# Patient Record
Sex: FEMALE | Race: WHITE | NOT HISPANIC OR LATINO
[De-identification: names, ages, dates, MRNs, and addresses within clinical notes are randomized per-mention and may not be internally consistent; named-entity substitution may affect disease eponyms.]

---

## 2020-11-06 PROBLEM — Z00.00 ENCOUNTER FOR PREVENTIVE HEALTH EXAMINATION: Status: ACTIVE | Noted: 2020-11-06

## 2020-12-23 ENCOUNTER — APPOINTMENT (OUTPATIENT)
Dept: OBGYN | Facility: CLINIC | Age: 65
End: 2020-12-23
Payer: MEDICARE

## 2020-12-23 VITALS
BODY MASS INDEX: 25.52 KG/M2 | TEMPERATURE: 97.3 F | WEIGHT: 130 LBS | HEIGHT: 60 IN | SYSTOLIC BLOOD PRESSURE: 118 MMHG | DIASTOLIC BLOOD PRESSURE: 70 MMHG

## 2020-12-23 DIAGNOSIS — Z86.69 PERSONAL HISTORY OF OTHER DISEASES OF THE NERVOUS SYSTEM AND SENSE ORGANS: ICD-10-CM

## 2020-12-23 DIAGNOSIS — Z80.41 FAMILY HISTORY OF MALIGNANT NEOPLASM OF OVARY: ICD-10-CM

## 2020-12-23 DIAGNOSIS — Z80.3 FAMILY HISTORY OF MALIGNANT NEOPLASM OF BREAST: ICD-10-CM

## 2020-12-23 DIAGNOSIS — Z86.39 PERSONAL HISTORY OF OTHER ENDOCRINE, NUTRITIONAL AND METABOLIC DISEASE: ICD-10-CM

## 2020-12-23 DIAGNOSIS — Z63.5 DISRUPTION OF FAMILY BY SEPARATION AND DIVORCE: ICD-10-CM

## 2020-12-23 DIAGNOSIS — Z78.9 OTHER SPECIFIED HEALTH STATUS: ICD-10-CM

## 2020-12-23 DIAGNOSIS — Z84.1 FAMILY HISTORY OF DISORDERS OF KIDNEY AND URETER: ICD-10-CM

## 2020-12-23 PROCEDURE — G0101: CPT

## 2020-12-23 PROCEDURE — 99072 ADDL SUPL MATRL&STAF TM PHE: CPT

## 2020-12-23 RX ORDER — LIOTHYRONINE SODIUM 5 UG/1
5 TABLET ORAL
Refills: 0 | Status: ACTIVE | COMMUNITY

## 2020-12-23 RX ORDER — TOPIRAMATE 50 MG/1
50 TABLET, FILM COATED ORAL
Refills: 0 | Status: ACTIVE | COMMUNITY

## 2020-12-23 RX ORDER — BUSPIRONE HYDROCHLORIDE 15 MG/1
15 TABLET ORAL
Refills: 0 | Status: ACTIVE | COMMUNITY

## 2020-12-23 RX ORDER — TRIAMTERENE AND HYDROCHLOROTHIAZIDE 37.5; 25 MG/1; MG/1
37.5-25 CAPSULE ORAL
Refills: 0 | Status: ACTIVE | COMMUNITY

## 2020-12-23 RX ORDER — EPINEPHRINE 1 MG/ML
INJECTION INTRAMUSCULAR; INTRAVENOUS; SUBCUTANEOUS
Refills: 0 | Status: ACTIVE | COMMUNITY

## 2020-12-23 RX ORDER — ACETAMINOPHEN 325 MG
TABLET ORAL
Refills: 0 | Status: ACTIVE | COMMUNITY

## 2020-12-23 RX ORDER — VITAMIN E 268 MG
400 CAPSULE ORAL
Refills: 0 | Status: ACTIVE | COMMUNITY

## 2020-12-23 RX ORDER — POTASSIUM CHLORIDE 1.5 G/1.58G
20 POWDER, FOR SOLUTION ORAL
Refills: 0 | Status: ACTIVE | COMMUNITY

## 2020-12-23 RX ORDER — LEVOTHYROXINE SODIUM 112 MCG
112 TABLET ORAL
Refills: 0 | Status: ACTIVE | COMMUNITY

## 2020-12-23 RX ORDER — VALACYCLOVIR 500 MG/1
500 TABLET, FILM COATED ORAL
Refills: 0 | Status: ACTIVE | COMMUNITY

## 2020-12-23 SDOH — SOCIAL STABILITY - SOCIAL INSECURITY: DISRUPTION OF FAMILY BY SEPARATION AND DIVORCE: Z63.5

## 2020-12-23 NOTE — DISCUSSION/SUMMARY
[FreeTextEntry1] : 66 yo here for well woman exam. \par -Pap + HPV today given no prior records, if negative, can discontinue given age\par -Up to date on health maintenance screening\par -Return in 1 yr or prn

## 2020-12-23 NOTE — PHYSICAL EXAM
[Appropriately responsive] : appropriately responsive [Alert] : alert [No Acute Distress] : no acute distress [No Lymphadenopathy] : no lymphadenopathy [Soft] : soft [Non-tender] : non-tender [Non-distended] : non-distended [No HSM] : No HSM [No Lesions] : no lesions [No Mass] : no mass [Oriented x3] : oriented x3 [Examination Of The Breasts] : a normal appearance [No Discharge] : no discharge [No Masses] : no breast masses were palpable [Labia Majora] : normal [Labia Minora] : normal [Normal] : normal [Uterine Adnexae] : normal [Tenderness] : nontender [Enlarged ___ wks] : not enlarged

## 2020-12-23 NOTE — HISTORY OF PRESENT ILLNESS
[postmenopausal] : postmenopausal [N] : Patient is not sexually active [Y] : Positive pregnancy history [TextBox_4] : 64 yo here for well woman exam. Menopause age 35. Denies PMB, abdominal pain. No complaints today.  [PGxTotal] : 1 [[facility] [[facility] [FreeTextEntry1] : NSVDx1. Denies cysts, fibroids, abnormal pap, STI

## 2020-12-29 LAB — HPV HIGH+LOW RISK DNA PNL CVX: NOT DETECTED

## 2020-12-30 LAB — CYTOLOGY CVX/VAG DOC THIN PREP: ABNORMAL

## 2021-01-06 ENCOUNTER — APPOINTMENT (OUTPATIENT)
Dept: OBGYN | Facility: CLINIC | Age: 66
End: 2021-01-06

## 2022-01-03 ENCOUNTER — APPOINTMENT (OUTPATIENT)
Dept: OBGYN | Facility: CLINIC | Age: 67
End: 2022-01-03
Payer: MEDICARE

## 2022-01-03 VITALS
WEIGHT: 128 LBS | DIASTOLIC BLOOD PRESSURE: 64 MMHG | HEIGHT: 60 IN | TEMPERATURE: 97.3 F | SYSTOLIC BLOOD PRESSURE: 110 MMHG | RESPIRATION RATE: 16 BRPM | BODY MASS INDEX: 25.13 KG/M2

## 2022-01-03 PROCEDURE — G0101: CPT

## 2022-01-03 PROCEDURE — 99397 PER PM REEVAL EST PAT 65+ YR: CPT

## 2022-01-03 NOTE — HISTORY OF PRESENT ILLNESS
[Patient reported PAP Smear was normal] : Patient reported PAP Smear was normal [postmenopausal] : postmenopausal [N] : Patient is not sexually active [Y] : Positive pregnancy history [Patient reported mammogram was normal] : Patient reported mammogram was normal [Patient reported bone density results were normal] : Patient reported bone density results were normal [TextBox_4] : 65 yo here for well woman exam. Menopause age 35. Denies PMB, abdominal pain. No complaints today.  [Mammogramdate] : 2021 [PapSmeardate] : 12/30/2020 [TextBox_31] : HPV neg [BoneDensityDate] : 2021 [PGxTotal] : 1 [[facility]] : 1 [[facility]] : 1 [FreeTextEntry1] : NSVDx1. Denies cysts, fibroids, abnormal pap, STI

## 2022-01-03 NOTE — PHYSICAL EXAM
[Chaperone Present] : A chaperone was present in the examining room during all aspects of the physical examination [FreeTextEntry1] : MANPREET Valadez  [Appropriately responsive] : appropriately responsive [Alert] : alert [No Acute Distress] : no acute distress [No Lymphadenopathy] : no lymphadenopathy [Soft] : soft [Non-tender] : non-tender [Non-distended] : non-distended [No HSM] : No HSM [No Lesions] : no lesions [No Mass] : no mass [Oriented x3] : oriented x3 [Examination Of The Breasts] : a normal appearance [No Discharge] : no discharge [No Masses] : no breast masses were palpable [Labia Majora] : normal [Labia Minora] : normal [Normal] : normal [Tenderness] : nontender [Enlarged ___ wks] : not enlarged [Uterine Adnexae] : normal

## 2022-11-07 ENCOUNTER — APPOINTMENT (OUTPATIENT)
Dept: ORTHOPEDIC SURGERY | Facility: CLINIC | Age: 67
End: 2022-11-07

## 2022-11-07 VITALS — HEIGHT: 60 IN | WEIGHT: 127 LBS | BODY MASS INDEX: 24.94 KG/M2

## 2022-11-07 PROCEDURE — 99204 OFFICE O/P NEW MOD 45 MIN: CPT

## 2022-11-07 RX ORDER — CEPHALEXIN 250 MG/1
250 CAPSULE ORAL
Qty: 20 | Refills: 0 | Status: COMPLETED | COMMUNITY
Start: 2022-07-29

## 2022-11-07 RX ORDER — CEFPODOXIME PROXETIL 100 MG/1
100 TABLET, FILM COATED ORAL
Qty: 20 | Refills: 0 | Status: COMPLETED | COMMUNITY
Start: 2022-10-25

## 2022-11-07 RX ORDER — IBUPROFEN 800 MG/1
800 TABLET, FILM COATED ORAL
Qty: 28 | Refills: 0 | Status: ACTIVE | COMMUNITY
Start: 2022-09-30

## 2022-11-07 NOTE — DISCUSSION/SUMMARY
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 The patient would like to pursue conservative measures at this time. After consideration of various non-operative treatment modalities, the patient would like to proceed with the modalities listed below. Patient reports that she cannot do PT but is amenable to a home exercise program. \par \par The risks and benefits of additional diagnostic imaging were discussed. The patient agrees to defer at this time in favor of a trial of conservative treatment.\par \par The patient is in agreement with the treatment plan and all questions were answered. \par \par ..........\par \par Plan\par \par - Counseling: Patient recommended to modify their activities until symptoms resolve. \par - Home Exercise Program: Packet with exercises from [employer] provided to patient in the office today. \par - Meloxicam: Medication script sent to the patient’s preferred pharmacy. Patient was instructed to take this medication with food on an as needed basis. Patient educated on the gastrointestinal side effects with excessive use. \par - Follow-up: as needed in 6-8 weeks if failing to improve\par \par ..........\par \par Medical Decision Making\par \par • Problem:\par    - Chronic illness with exacerbation - moderate\par • Data:\par    - Review of available external records - low\par • Risk:\par    - Prescription medication - moderate\par    - Physical therapy - low\par • MDM Level:\par    - Level 4\par
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
## 2022-11-07 NOTE — IMAGING
[de-identified] : Bilateral Shoulder Examination:\par \par • Constitutional: \par    - In no acute distress: yes\par    - Alert and oriented (person, place, time): yes \par \par • Inspection and Palpation: \par    - Skin: intact\par    - Swelling: none\par    - Glenohumeral joint line tenderness: negative\par    - Bicipital groove tenderness: negative\par    - AC joint tenderness: negative\par    - Scapulothoracic tenderness: negative\par    - Cervical spine tenderness: negative\par    - Palpable lymphadenopathy: none \par    - Peripheral edema: none\par \par • Range of Motion (in degrees):\par    - Active forward elevation: 175\par    - Passive forward elevation: 175\par    - External rotation at the side: 80\par    - Internal rotation behind the back: T7 \par 	\par • Stability:\par    - Apprehension sign: negative\par    - Sulcus sign: negative\par \par • Neurovascular: \par    - Atrophy of rotator cuff: absent \par    - Forward elevation strength (out of 5): 5\par    - External rotation strength at the side (out of 5): 5\par    - Internal rotation strength at the side (out of 5): 5\par    - Abduction strength (out of 5): 5\par    - Sensation to light touch: intact in axillary, median, radial and ulnar distributions\par    - Capillary refill: less than 2 seconds in fingers\par \par • Special Tests:\par    - Ahn impingement test: positive\par    - Cross body adduction test: negative \par    - Sprague's test: negative \par    - Speed’s test: negative\par    - Spurling’s test: negative\par    - Belly press test: normal\par    - Hornblower sign: negative\par    - Neck examination: painless range of motion\par \par • Comments:\par    - None \par \par  [Facet arthropathy] : Facet arthropathy [Disc space narrowing] : Disc space narrowing [Outside films reviewed] : Outside films reviewed [There are no fractures, subluxations or dislocations. No significant abnormalities are seen] : There are no fractures, subluxations or dislocations. No significant abnormalities are seen [Type 2 acromion] : Type 2 acromion [FreeTextEntry1] : fusion within acceptable parameters from C5-7

## 2022-12-08 ENCOUNTER — NON-APPOINTMENT (OUTPATIENT)
Age: 67
End: 2022-12-08

## 2022-12-16 ENCOUNTER — NON-APPOINTMENT (OUTPATIENT)
Age: 67
End: 2022-12-16

## 2022-12-16 ENCOUNTER — APPOINTMENT (OUTPATIENT)
Dept: PULMONOLOGY | Facility: CLINIC | Age: 67
End: 2022-12-16

## 2022-12-16 VITALS
DIASTOLIC BLOOD PRESSURE: 70 MMHG | HEIGHT: 60 IN | SYSTOLIC BLOOD PRESSURE: 98 MMHG | TEMPERATURE: 97.4 F | BODY MASS INDEX: 24.94 KG/M2 | OXYGEN SATURATION: 96 % | HEART RATE: 74 BPM | WEIGHT: 127 LBS

## 2022-12-16 DIAGNOSIS — J20.9 ACUTE BRONCHITIS, UNSPECIFIED: ICD-10-CM

## 2022-12-16 DIAGNOSIS — Z87.09 PERSONAL HISTORY OF OTHER DISEASES OF THE RESPIRATORY SYSTEM: ICD-10-CM

## 2022-12-16 DIAGNOSIS — J90 PLEURAL EFFUSION, NOT ELSEWHERE CLASSIFIED: ICD-10-CM

## 2022-12-16 DIAGNOSIS — R05.3 CHRONIC COUGH: ICD-10-CM

## 2022-12-16 PROCEDURE — 94010 BREATHING CAPACITY TEST: CPT

## 2022-12-16 PROCEDURE — 99204 OFFICE O/P NEW MOD 45 MIN: CPT | Mod: 25

## 2022-12-16 RX ORDER — FLUCONAZOLE 150 MG/1
150 TABLET ORAL
Qty: 1 | Refills: 0 | Status: DISCONTINUED | COMMUNITY
Start: 2022-12-07 | End: 2022-12-16

## 2022-12-16 RX ORDER — VALACYCLOVIR 500 MG/1
500 TABLET, FILM COATED ORAL
Refills: 0 | Status: DISCONTINUED | COMMUNITY
End: 2022-12-16

## 2022-12-16 NOTE — REASON FOR VISIT
[Initial] : an initial visit [Shortness of Breath] : shortness of breath [TextBox_44] : assessment. Pt states for the past 2 months she has been experiencing chest tightness, fatigue, and mild shortness of breath. Pt CT suggest pleural effusion.

## 2022-12-16 NOTE — HISTORY OF PRESENT ILLNESS
[Never] : never [TextBox_4] : 67 female no hx tobacco\par Presents today bec noted pleural effusion on ct abdomen at [place] when evaluated for urine infection  1 week ago  no admission\par Pt denies any sob but recent have cough  no fever now but 102 at time of hosp for urine infection\par no chest pain no tightness \par also co sinus head congestion\par today feels good sl headache and  post nasal drip syndrome \par no fever +cough no phlegm\par no wheeze no dyspnea\par precipitating factors  none \par no nite awakening

## 2022-12-16 NOTE — DISCUSSION/SUMMARY
[FreeTextEntry1] : Ms. José presented to [place] emergency room with symptoms consistent with a viral infection.  She was found to have urine infection was treated with antibiotics.  CAT scan of the abdomen revealed minimal bilateral effusions.  I reviewed the CAT scan myself and the effusions are very minimal and not significant.  I had a long discussion with her and reassured her that there is no further concern regarding the trace pleural effusions.  She does have a persistent cough likely secondary to a viral bronchitis.  She has been treated with antibiotics and she does not seem to have any obstructive disease.  I have asked to use Tessalon Perles 200 mg 1 pill 2-3 times a day.  She can also use Robitussin-DM in conjunction with this medicine to temper the cough.  I have asked her to come back to see me in 3 weeks time to check on her progress.\par The patient understands and agrees with plan of care.\par Today's office visit encompassed 46  minutes. I conducted an extensive history ,physical exam and reviewed diagnosis and treatment options  including diagnostic tests,radiologic studies including cat scans  and the use of prescription medication.

## 2022-12-16 NOTE — PHYSICAL EXAM
[No Acute Distress] : no acute distress [Normal Oropharynx] : normal oropharynx [Normal Appearance] : normal appearance [No Neck Mass] : no neck mass [Normal Rate/Rhythm] : normal rate/rhythm [Normal S1, S2] : normal s1, s2 [No Murmurs] : no murmurs [No Resp Distress] : no resp distress [Rhonchi] : rhonchi [No Abnormalities] : no abnormalities [Benign] : benign [Normal Gait] : normal gait [No Clubbing] : no clubbing [No Cyanosis] : no cyanosis [No Edema] : no edema [FROM] : FROM [Normal Color/ Pigmentation] : normal color/ pigmentation [No Focal Deficits] : no focal deficits [Oriented x3] : oriented x3 [Normal Affect] : normal affect [TextBox_68] : Minimal rhonchi bilaterally

## 2023-01-09 ENCOUNTER — APPOINTMENT (OUTPATIENT)
Dept: OBGYN | Facility: CLINIC | Age: 68
End: 2023-01-09
Payer: MEDICARE

## 2023-01-09 VITALS
WEIGHT: 127 LBS | SYSTOLIC BLOOD PRESSURE: 106 MMHG | DIASTOLIC BLOOD PRESSURE: 62 MMHG | BODY MASS INDEX: 24.94 KG/M2 | HEIGHT: 60 IN

## 2023-01-09 DIAGNOSIS — Z01.419 ENCOUNTER FOR GYNECOLOGICAL EXAMINATION (GENERAL) (ROUTINE) W/OUT ABNORMAL FINDINGS: ICD-10-CM

## 2023-01-09 DIAGNOSIS — Z12.39 ENCOUNTER FOR OTHER SCREENING FOR MALIGNANT NEOPLASM OF BREAST: ICD-10-CM

## 2023-01-09 PROCEDURE — 99397 PER PM REEVAL EST PAT 65+ YR: CPT

## 2023-01-09 NOTE — DISCUSSION/SUMMARY
[FreeTextEntry1] : 64 yo here for well woman exam. Vulvovaginal itching. \par 1) Health maintenance:\par -Pap + HPV up to date\par -Mammogram referral given\par -Encouraged to get colonoscopy\par -Up to date on DEXA\par \par 2) Vulvovaginal itching: \par -Affirm, will contact with results\par \par

## 2023-01-09 NOTE — PHYSICAL EXAM
[Chaperone Present] : A chaperone was present in the examining room during all aspects of the physical examination [FreeTextEntry1] : MANPREET Dorantes [Appropriately responsive] : appropriately responsive [Alert] : alert [No Acute Distress] : no acute distress [No Lymphadenopathy] : no lymphadenopathy [Soft] : soft [Non-tender] : non-tender [Non-distended] : non-distended [No HSM] : No HSM [No Lesions] : no lesions [No Mass] : no mass [Oriented x3] : oriented x3 [Examination Of The Breasts] : a normal appearance [No Discharge] : no discharge [No Masses] : no breast masses were palpable [Labia Majora] : normal [Labia Minora] : normal [Normal] : normal [Tenderness] : nontender [Enlarged ___ wks] : not enlarged [Uterine Adnexae] : normal

## 2023-01-09 NOTE — HISTORY OF PRESENT ILLNESS
[Patient reported mammogram was normal] : Patient reported mammogram was normal [Patient reported PAP Smear was normal] : Patient reported PAP Smear was normal [Patient reported bone density results were normal] : Patient reported bone density results were normal [postmenopausal] : postmenopausal [N] : Patient is not sexually active [Y] : Positive pregnancy history [TextBox_4] : 67 yo here for well woman exam. Menopause age 35. Denies PMB, abdominal pain. Was on PCN for UTI last month, took fluconazole once then took monistat x 5 days. States last week she put on outside as felt itchy.  [Mammogramdate] : 2021 [PapSmeardate] : 12/30/2020 [TextBox_31] : HPV neg [BoneDensityDate] : 2021 [ColonoscopyDate] : states up states >10 yrs ago [TextBox_43] : planning to repeat this year [PGxTotal] : 1 [[facility]] : 1 [[facility] : 1 [FreeTextEntry1] : NSVDx1. Denies cysts, fibroids, abnormal pap, STI

## 2023-01-11 LAB
CANDIDA VAG CYTO: NOT DETECTED
G VAGINALIS+PREV SP MTYP VAG QL MICRO: NOT DETECTED
T VAGINALIS VAG QL WET PREP: NOT DETECTED

## 2023-01-19 ENCOUNTER — APPOINTMENT (OUTPATIENT)
Dept: PULMONOLOGY | Facility: CLINIC | Age: 68
End: 2023-01-19

## 2023-01-20 ENCOUNTER — NON-APPOINTMENT (OUTPATIENT)
Age: 68
End: 2023-01-20

## 2023-03-15 RX ORDER — FLUCONAZOLE 150 MG/1
150 TABLET ORAL
Qty: 1 | Refills: 0 | Status: ACTIVE | COMMUNITY
Start: 2023-03-15 | End: 1900-01-01

## 2023-03-22 ENCOUNTER — APPOINTMENT (OUTPATIENT)
Dept: OBGYN | Facility: CLINIC | Age: 68
End: 2023-03-22
Payer: MEDICARE

## 2023-03-22 VITALS
DIASTOLIC BLOOD PRESSURE: 64 MMHG | HEIGHT: 60 IN | WEIGHT: 127 LBS | SYSTOLIC BLOOD PRESSURE: 108 MMHG | BODY MASS INDEX: 24.94 KG/M2

## 2023-03-22 DIAGNOSIS — N89.8 OTHER SPECIFIED NONINFLAMMATORY DISORDERS OF VAGINA: ICD-10-CM

## 2023-03-22 LAB
BILIRUB UR QL STRIP: NEGATIVE
CLARITY UR: CLEAR
COLLECTION METHOD: NORMAL
GLUCOSE UR-MCNC: NEGATIVE
HCG UR QL: 0.2 EU/DL
HGB UR QL STRIP.AUTO: NEGATIVE
KETONES UR-MCNC: NEGATIVE
LEUKOCYTE ESTERASE UR QL STRIP: NEGATIVE
NITRITE UR QL STRIP: NEGATIVE
PH UR STRIP: 5
PROT UR STRIP-MCNC: NEGATIVE
SP GR UR STRIP: 1.01

## 2023-03-22 PROCEDURE — 81003 URINALYSIS AUTO W/O SCOPE: CPT | Mod: QW

## 2023-03-22 PROCEDURE — 99214 OFFICE O/P EST MOD 30 MIN: CPT

## 2023-03-22 NOTE — HISTORY OF PRESENT ILLNESS
[FreeTextEntry1] : 67 yo here for vaginal itching. Went to PCP for checkup and had urine testing showing UTI. Was given Nitrofurantoin by PCP for UTI but states it did not work, unable to clarify. Pt went to urgent care for itching and being "dry down there" and was given cefdinir, states she was told would cure yeast or UTI and flu. States itching continued, we prescribed fluconazole which she completed 5 or 6 days ago. States on Monday she took another fluconazole, which was prescribed by her PCP. States continues to have dryness and itching. Has not been sexually active in many years. Requests all testing that can be done today. \par \par No douching, change in contacts or irritants. \par \par Udip UA negative

## 2023-03-22 NOTE — DISCUSSION/SUMMARY
[FreeTextEntry1] : 66 yo with vulvovaginal itching and discharge, recent antibiotics x 2. \par -Affirm\par -GC/CT\par -Vulvovaginal hygeiene measures reviewed\par -Will contact with results

## 2023-03-22 NOTE — REASON FOR VISIT
[Follow-Up] : a follow-up evaluation of [Vulvar/Vaginal Complaint] : vulvar/vaginal complaint [FreeTextEntry2] : vaginal itching

## 2023-03-22 NOTE — PHYSICAL EXAM
[Chaperone Present] : A chaperone was present in the examining room during all aspects of the physical examination [Appropriately responsive] : appropriately responsive [Alert] : alert [No Acute Distress] : no acute distress [No Lymphadenopathy] : no lymphadenopathy [Soft] : soft [Non-tender] : non-tender [Non-distended] : non-distended [No HSM] : No HSM [No Lesions] : no lesions [No Mass] : no mass [Oriented x3] : oriented x3 [Labia Majora] : normal [Labia Minora] : normal [Atrophy] : atrophy [Normal] : normal [Uterine Adnexae] : normal [FreeTextEntry1] : MANPREET Hurtado [Tenderness] : nontender [Enlarged ___ wks] : not enlarged

## 2023-03-24 ENCOUNTER — NON-APPOINTMENT (OUTPATIENT)
Age: 68
End: 2023-03-24

## 2023-03-24 LAB
C TRACH RRNA SPEC QL NAA+PROBE: NOT DETECTED
CANDIDA VAG CYTO: NOT DETECTED
G VAGINALIS+PREV SP MTYP VAG QL MICRO: NOT DETECTED
N GONORRHOEA RRNA SPEC QL NAA+PROBE: NOT DETECTED
SOURCE AMPLIFICATION: NORMAL
T VAGINALIS VAG QL WET PREP: NOT DETECTED

## 2023-04-17 ENCOUNTER — NON-APPOINTMENT (OUTPATIENT)
Age: 68
End: 2023-04-17

## 2023-04-18 ENCOUNTER — NON-APPOINTMENT (OUTPATIENT)
Age: 68
End: 2023-04-18

## 2023-06-26 ENCOUNTER — APPOINTMENT (OUTPATIENT)
Dept: ORTHOPEDIC SURGERY | Facility: CLINIC | Age: 68
End: 2023-06-26
Payer: MEDICARE

## 2023-06-26 VITALS — WEIGHT: 127 LBS | BODY MASS INDEX: 24.94 KG/M2 | HEIGHT: 60 IN

## 2023-06-26 PROCEDURE — 20610 DRAIN/INJ JOINT/BURSA W/O US: CPT | Mod: 50

## 2023-06-26 PROCEDURE — 99214 OFFICE O/P EST MOD 30 MIN: CPT | Mod: 25

## 2023-06-26 RX ORDER — BENZONATATE 200 MG/1
200 CAPSULE ORAL
Qty: 60 | Refills: 1 | Status: COMPLETED | COMMUNITY
Start: 2022-12-16 | End: 2023-06-26

## 2023-06-26 RX ORDER — POTASSIUM CHLORIDE 1500 MG/1
20 TABLET, FILM COATED, EXTENDED RELEASE ORAL
Qty: 90 | Refills: 0 | Status: ACTIVE | COMMUNITY
Start: 2023-04-09

## 2023-06-26 RX ORDER — MELOXICAM 7.5 MG/1
7.5 TABLET ORAL DAILY
Qty: 90 | Refills: 0 | Status: ACTIVE | COMMUNITY
Start: 2022-11-07 | End: 1900-01-01

## 2023-06-26 RX ORDER — TRAZODONE HYDROCHLORIDE 50 MG/1
50 TABLET ORAL
Qty: 90 | Refills: 0 | Status: ACTIVE | COMMUNITY
Start: 2023-05-15

## 2023-06-26 NOTE — DISCUSSION/SUMMARY
[de-identified] : (Assessment and Plan)\par \par History, clinical examination and imaging were most consistent with:\par -bilateral shoulder subacromial impingement and partial rotator cuff tear\par -prior cervical fusion\par \par The diagnosis was explained in detail. The potential non-surgical and surgical treatments were reviewed. The relative risks and benefits of each option were considered relative to the patient’s age, activity level, medical history, symptom severity and previously attempted treatments. \par \par The patient was advised to consult with their primary medical provider prior to initiation of any new medications to reduce the risk of adverse effects specific to their long-term home medications and medical history. The risk of gastrointestinal irritation and kidney injury specific to long-term NSAID use was discussed.   \par \par -Patient will proceed with formal PT.\par -Cortisone injection performed today for symptom relief.\par -Meloxicam as needed for pain. \par -The added clinical utility of an MRI was discussed. The patient deferred further diagnostic testing at this time.\par -Follow up in 6 weeks. Consider MRI if symptoms persist. \par \par (MDM) \par \par Problem Complexity\par -Moderate: chronic illness with exacerbation\par \par Risk\par -Moderate: injection\par \par (Procedure Note)\par \par Injection location was the:\par -left subacromial bursa\par -right subacromial bursa\par \par Injection contents were: \par 40 mg of kenalog and 5 mL of 1% lidocaine\par \par Procedure Details: \par -Informed consent was obtained. Discussed possible risks of corticosteroid injection including hyperglycemia, infection and skin discoloration. \par -Discussed that due to infection risk, an interval between injection and any future surgery is 6 weeks for arthroscopy and 3 months for arthroplasty.\par -Injection performed using aseptic technique. Hemostasis was confirmed.\par -Procedure was tolerated well with no complications. \par \par

## 2023-06-26 NOTE — IMAGING
[Facet arthropathy] : Facet arthropathy [Disc space narrowing] : Disc space narrowing [Outside films reviewed] : Outside films reviewed [There are no fractures, subluxations or dislocations. No significant abnormalities are seen] : There are no fractures, subluxations or dislocations. No significant abnormalities are seen [Type 2 acromion] : Type 2 acromion [FreeTextEntry1] : fusion within acceptable parameters from C5-7 [de-identified] : (Physical Exam: Shoulder)\par \par Laterality is bilateral\par \par Patient is in no acute distress, alert and oriented\par Sensation is grossly intact to light touch in the hand\par Motor function is 5/5 in the hand\par Capillary refill is less than 2 seconds in the fingers\par Lymphadenopathy is not present\par Peripheral edema is not present\par \par Skin is intact \par Swelling is not present \par Atrophy is not present\par Scapular winging is not present\par Deformity of the AC joint is not present\par Deformity of the biceps is not present \par \par Bicipital groove tenderness is not present \par AC joint tenderness is not present \par Scapulothoracic tenderness is not present \par Cervical paraspinal tenderness is not present \par \par Active forward elevation is 175\par Passive forward elevation is 175\par External rotation at the side is 80\par Internal rotation behind the back is to the level of T7 \par \par Forward elevation strength is 5/5\par External rotation strength at the side is 5/5 \par Internal rotation strength at the side is 5/5 \par Deltoid strength of anterior, posterior and lateral heads is 5/5\par \par Ahn test is abnormal \par O’Javon’s test is normal\par Speed’s test is normal\par Cross body adduction test is normal\par Apprehension and relocation is normal\par Sulcus sign is normal\par Belly press test is normal\par Spurling’s test is normal\par

## 2023-06-26 NOTE — HISTORY OF PRESENT ILLNESS
[de-identified] : (Follow-Up Visit) \par \par (History of Present Illness) \par \par Body part causing symptoms is the Bilateral shoulders \par Pain score at rest is  8/10\par Pain score during activity is  7/10\par Treatments since last visit include meloxicam and PT\par Compared to last visit, symptoms are improved but she still has pain at night\par She is interested in injection today

## 2023-07-17 ENCOUNTER — APPOINTMENT (OUTPATIENT)
Dept: ORTHOPEDIC SURGERY | Facility: CLINIC | Age: 68
End: 2023-07-17
Payer: MEDICARE

## 2023-07-17 VITALS — HEIGHT: 60 IN | BODY MASS INDEX: 24.94 KG/M2 | WEIGHT: 127 LBS

## 2023-07-17 DIAGNOSIS — M79.646 PAIN IN UNSPECIFIED FINGER(S): ICD-10-CM

## 2023-07-17 PROCEDURE — 99214 OFFICE O/P EST MOD 30 MIN: CPT | Mod: 25

## 2023-07-17 PROCEDURE — 73140 X-RAY EXAM OF FINGER(S): CPT | Mod: RT

## 2023-07-17 PROCEDURE — 20550 NJX 1 TENDON SHEATH/LIGAMENT: CPT | Mod: RT

## 2023-07-17 NOTE — ASSESSMENT
[FreeTextEntry1] : Right middle finger trigger - Discussed with patient the pathoanatomy of trigger and options going forward. Risks/benefits discussed as well as natural progression that injection will provide some relief but symptoms may recur. Discussed that pain may worsen in coming days but will subside. Discussed that we can repeat an injection or that operative intervention may be indicated down the line.\par \par Procedure 1 - 0.5cc 1%lidocaine + 0.5cc 40mg/cc Kenalog administered to right middle finger A1 pulley following sterilization with Betadine. Patient tolerated this well.\par \par F/u 3 months

## 2023-07-17 NOTE — HISTORY OF PRESENT ILLNESS
[de-identified] : Age: 68F\par PMHx: Arthritis\par Hand Dominance: RHD\par Chief Complaint: Right middle finger pain since July 2022 with NKI.Patient states the pain is localized at the base of her middle finger. Patient states that she cannot fully curl her middle finger to the palm of her hand. Denies numbness/tingling. Denies clicking,sticking locking. \par Trauma: NKI\par Outside Imaging/Treatment: none\par OTC Medications: ibuprofen, tylenol with some relief\par OT/PT: none\par Bracing: none\par Pain worse with: exertion\par Pain better with: OTC medication\par

## 2023-07-17 NOTE — IMAGING
[de-identified] : Right hand with no swelling or erythema. Able to make fist, oppose thumb to small finger with good thenar bulk. No intrinsic atrophy. Tender along distal palmar crease, most notable at middle finger. No overt locking with catching palpable. Sensation intact throughout with <2sec cap refill. \par \par Right middle finger radiograph with no fracture or dislocation.

## 2024-01-10 ENCOUNTER — APPOINTMENT (OUTPATIENT)
Dept: ORTHOPEDIC SURGERY | Facility: CLINIC | Age: 69
End: 2024-01-10
Payer: MEDICARE

## 2024-01-10 VITALS — WEIGHT: 127 LBS | HEIGHT: 60 IN | BODY MASS INDEX: 24.94 KG/M2

## 2024-01-10 DIAGNOSIS — M18.9 OSTEOARTHRITIS OF FIRST CARPOMETACARPAL JOINT, UNSPECIFIED: ICD-10-CM

## 2024-01-10 PROCEDURE — 20605 DRAIN/INJ JOINT/BURSA W/O US: CPT | Mod: RT

## 2024-01-10 PROCEDURE — 99214 OFFICE O/P EST MOD 30 MIN: CPT | Mod: 25

## 2024-01-10 PROCEDURE — 99204 OFFICE O/P NEW MOD 45 MIN: CPT | Mod: 25

## 2024-01-10 NOTE — HISTORY OF PRESENT ILLNESS
[de-identified] : Age: 68F PMHx: Arthritis Hand Dominance: RHD Chief Complaint: Right middle finger pain since July 2022 with NKI. Patient states the pain is localized at the base of her middle finger. Patient states that she cannot fully curl her middle finger to the palm of her hand. Denies numbness/tingling. Denies clicking, sticking locking.  Trauma: NKI Outside Imaging/Treatment: none OTC Medications: ibuprofen, tylenol with some relief OT/PT: none Bracing: none Pain worse with: exertion Pain better with: OTC medication  01/10/24: f/u right middle finger. Patient reports that she had a CSI on 07/17/23 and reports that it is still providing relief. Patient is here for her right thumb, stating that it has been bothering her since December 2023. Patient has been taking tylenol with minimal relief. Patient would like CSI for her right thumb. Denies numbness/tingling.

## 2024-01-10 NOTE — ASSESSMENT
[FreeTextEntry1] : Right middle finger trigger - Discussed with patient the pathoanatomy of trigger and options going forward. Risks/benefits discussed as well as natural progression that injection will provide some relief but symptoms may recur. Discussed that pain may worsen in coming days but will subside. Discussed that we can repeat an injection or that operative intervention may be indicated down the line.  Right thumb CMC arthritis - reviewed radiographs and discussed pathoanatomy with patient. Discussed treatment ladder including NSAIDs, bracing (shown MetaGrip), hand therapy, CSI and basal joint arthroplasty. After discussion of risks/benefits, including glucose intolerance in the diabetic population, patient elected to proceed with CSI to the thumb CMC joint.  Procedure 1 - 0.5cc 1% lidocaine + 0.5cc 40mg/cc Kenalog administered to the right thumb CMC joint following sterilization with Betadine. Patient tolerated this well.  F/u 6 months (obtain right wrist radiographs)

## 2024-01-10 NOTE — IMAGING
[de-identified] : Right hand with no swelling or erythema. Able to make fist, oppose thumb to small finger with good thenar bulk. No intrinsic atrophy. Tender along distal palmar crease, most notable at middle finger. No overt locking with catching palpable. Sensation intact throughout with <2sec cap refill. +ttp at thumb CMC, +grind  Right middle finger radiograph with no fracture or dislocation.

## 2024-01-11 ENCOUNTER — APPOINTMENT (OUTPATIENT)
Dept: OBGYN | Facility: CLINIC | Age: 69
End: 2024-01-11
Payer: MEDICARE

## 2024-01-11 VITALS
DIASTOLIC BLOOD PRESSURE: 60 MMHG | HEIGHT: 60 IN | WEIGHT: 127 LBS | SYSTOLIC BLOOD PRESSURE: 110 MMHG | BODY MASS INDEX: 24.94 KG/M2

## 2024-01-11 DIAGNOSIS — Z12.4 ENCOUNTER FOR SCREENING FOR MALIGNANT NEOPLASM OF CERVIX: ICD-10-CM

## 2024-01-11 DIAGNOSIS — N95.2 POSTMENOPAUSAL ATROPHIC VAGINITIS: ICD-10-CM

## 2024-01-11 LAB
BILIRUB UR QL STRIP: NORMAL
CLARITY UR: CLEAR
COLLECTION METHOD: NORMAL
GLUCOSE UR-MCNC: NORMAL
HCG UR QL: 0.2 EU/DL
HGB UR QL STRIP.AUTO: NORMAL
KETONES UR-MCNC: NORMAL
LEUKOCYTE ESTERASE UR QL STRIP: NORMAL
NITRITE UR QL STRIP: NORMAL
PH UR STRIP: 6
PROT UR STRIP-MCNC: NORMAL
SP GR UR STRIP: 1.02

## 2024-01-11 PROCEDURE — 99459 PELVIC EXAMINATION: CPT

## 2024-01-11 PROCEDURE — 81003 URINALYSIS AUTO W/O SCOPE: CPT | Mod: QW

## 2024-01-11 PROCEDURE — 99397 PER PM REEVAL EST PAT 65+ YR: CPT | Mod: GY

## 2024-01-11 PROCEDURE — G0101: CPT

## 2024-01-11 PROCEDURE — 99213 OFFICE O/P EST LOW 20 MIN: CPT

## 2024-01-11 NOTE — PLAN
[FreeTextEntry1] : Sindy is coming in for follow-up. Exam as above.  She reports using vaginal moisturizers for the atrophy with alleviation of symptoms. Pap done. Referral for DEXA given. Follow-up in 1 year pending negative results.

## 2024-01-11 NOTE — HISTORY OF PRESENT ILLNESS
[Patient reported mammogram was normal] : Patient reported mammogram was normal [Patient reported PAP Smear was normal] : Patient reported PAP Smear was normal [FreeTextEntry1] : Sindy is a 68-year-old coming in for follow up. History of atrophic vaginitis. Also due for PAP.  Pap done 3 years ago, negative. Mammogram at the end of  negative per patient. DEXA- not done recently. Colonoscopy - did cologuard a month ago.  LMP - age 39. No PMB FMP age 9 Sexually not active HSV, no recent outbreaks.  NSVDX1 PMH - hypothyroid, arthritis. PSH - breast fibroma removed.  Allergies - Bactrim, codeine, clindamycin, erythromycin, tramadol, xyzal, avelox, naprelan No smoking or drug use, ETOH - none [Mammogramdate] : 11/22 [PapSmeardate] : 12/20

## 2024-01-11 NOTE — PHYSICAL EXAM
[Chaperone Present] : A chaperone was present in the examining room during all aspects of the physical examination [Appropriately responsive] : appropriately responsive [Alert] : alert [No Acute Distress] : no acute distress [Soft] : soft [Non-tender] : non-tender [Non-distended] : non-distended [Oriented x3] : oriented x3 [Examination Of The Breasts] : a normal appearance [No Masses] : no breast masses were palpable [Labia Majora] : normal [Labia Minora] : normal [Atrophy] : atrophy [Normal] : normal [Uterine Adnexae] : non-palpable [FreeTextEntry1] :  Fiona Gipson [FreeTextEntry5] : Noted stenotic external os.

## 2024-01-12 ENCOUNTER — APPOINTMENT (OUTPATIENT)
Dept: ORTHOPEDIC SURGERY | Facility: CLINIC | Age: 69
End: 2024-01-12
Payer: MEDICARE

## 2024-01-12 PROCEDURE — 20610 DRAIN/INJ JOINT/BURSA W/O US: CPT | Mod: 50

## 2024-01-12 PROCEDURE — 99214 OFFICE O/P EST MOD 30 MIN: CPT | Mod: 25

## 2024-01-12 NOTE — IMAGING
[de-identified] : (Physical Exam: Shoulder)\par  \par  Laterality is bilateral\par  \par  Patient is in no acute distress, alert and oriented\par  Sensation is grossly intact to light touch in the hand\par  Motor function is 5/5 in the hand\par  Capillary refill is less than 2 seconds in the fingers\par  Lymphadenopathy is not present\par  Peripheral edema is not present\par  \par  Skin is intact \par  Swelling is not present \par  Atrophy is not present\par  Scapular winging is not present\par  Deformity of the AC joint is not present\par  Deformity of the biceps is not present \par  \par  Bicipital groove tenderness is not present \par  AC joint tenderness is not present \par  Scapulothoracic tenderness is not present \par  Cervical paraspinal tenderness is not present \par  \par  Active forward elevation is 175\par  Passive forward elevation is 175\par  External rotation at the side is 80\par  Internal rotation behind the back is to the level of T7 \par  \par  Forward elevation strength is 5/5\par  External rotation strength at the side is 5/5 \par  Internal rotation strength at the side is 5/5 \par  Deltoid strength of anterior, posterior and lateral heads is 5/5\par  \par  Ahn test is abnormal \par  Dunklin's test is normal\par  Speed's test is normal\par  Cross body adduction test is normal\par  Apprehension and relocation is normal\par  Sulcus sign is normal\par  Belly press test is normal\par  Spurling's test is normal\par

## 2024-01-12 NOTE — DISCUSSION/SUMMARY
[de-identified] : (Assessment and Plan)  History, clinical examination and imaging were most consistent with: -bilateral shoulder subacromial impingement and partial rotator cuff tear -prior cervical fusion  The diagnosis was explained in detail. The potential non-surgical and surgical treatments were reviewed. The relative risks and benefits of each option were considered relative to the patient's age, activity level, medical history, symptom severity and previously attempted treatments.   The patient was advised to consult with their primary medical provider prior to initiation of any new medications to reduce the risk of adverse effects specific to their long-term home medications and medical history. The risk of gastrointestinal irritation and kidney injury specific to long-term NSAID use was discussed.     -Patient will continue with HEP. -Cortisone injection performed today for symptom relief. -Meloxicam as needed for pain.  -MRI discussed and deferred at this time.  -Follow up in 3 months, consider repeat CSI vs MRI at that time.   (MDM)   Problem Complexity -Moderate: chronic illness with exacerbation  Risk -Moderate: injection  (Procedure Note)  Injection location was the: -left subacromial bursa -right subacromial bursa  Injection contents were:  40 mg of kenalog and 5 mL of 1% lidocaine  Procedure Details:  -Informed consent was obtained. Discussed possible risks of corticosteroid injection including hyperglycemia, infection and skin discoloration.  -Discussed that due to infection risk, an interval between injection and any future surgery is 6 weeks for arthroscopy and 3 months for arthroplasty. -Injection performed using aseptic technique. Hemostasis was confirmed. -Procedure was tolerated well with no complications.

## 2024-01-12 NOTE — HISTORY OF PRESENT ILLNESS
[de-identified] : 01/12/2024: f/u BL shoulders. last seen in June 2023, reports relief from CSI but pain has returned. doing HEP. interested in another injection today. she is caring for her mother and not interested in surgery at this time.   (Follow-Up Visit)  Body part causing symptoms is the Bilateral shoulders  Pain score at rest is  8/10 Pain score during activity is  7/10 Treatments since last visit include meloxicam and PT Compared to last visit, symptoms are improved but she still has pain at night She is interested in injection today

## 2024-01-15 LAB — HPV HIGH+LOW RISK DNA PNL CVX: NOT DETECTED

## 2024-01-18 LAB — CYTOLOGY CVX/VAG DOC THIN PREP: ABNORMAL

## 2024-04-26 NOTE — PLAN
[Joint Pain] : joint pain [Joint Stiffness] : joint stiffness [Difficulty Walking] : difficulty walking [Negative] : Psychiatric [de-identified] : left upper extremity swelling  [FreeTextEntry1] : 66 yo here for well woman exam. \par -Pap/HPV up to date \par -Mammogram/DEXA up to date, records requested\par -Up to date on colonoscopy\par -Return in 1 yr or prn\par

## 2024-05-29 ENCOUNTER — APPOINTMENT (OUTPATIENT)
Dept: ORTHOPEDIC SURGERY | Facility: CLINIC | Age: 69
End: 2024-05-29
Payer: MEDICARE

## 2024-05-29 VITALS — BODY MASS INDEX: 24.94 KG/M2 | HEIGHT: 60 IN | WEIGHT: 127 LBS

## 2024-05-29 DIAGNOSIS — M65.30 TRIGGER FINGER, UNSPECIFIED FINGER: ICD-10-CM

## 2024-05-29 PROCEDURE — 20550 NJX 1 TENDON SHEATH/LIGAMENT: CPT | Mod: RT

## 2024-05-29 PROCEDURE — 99214 OFFICE O/P EST MOD 30 MIN: CPT | Mod: 25

## 2024-05-29 PROCEDURE — 73110 X-RAY EXAM OF WRIST: CPT | Mod: RT

## 2024-05-29 RX ORDER — IBUPROFEN 600 MG/1
600 TABLET, FILM COATED ORAL 3 TIMES DAILY
Qty: 90 | Refills: 2 | Status: ACTIVE | COMMUNITY
Start: 2024-05-29 | End: 1900-01-01

## 2024-05-29 NOTE — HISTORY OF PRESENT ILLNESS
[de-identified] : Age: 68F PMHx: Arthritis Hand Dominance: RHD Chief Complaint: Right middle finger pain since July 2022 with NKI. Patient states the pain is localized at the base of her middle finger. Patient states that she cannot fully curl her middle finger to the palm of her hand. Denies numbness/tingling. Denies clicking, sticking locking.  Trauma: NKI Outside Imaging/Treatment: none OTC Medications: ibuprofen, tylenol with some relief OT/PT: none Bracing: none Pain worse with: exertion Pain better with: OTC medication  01/10/24: f/u right middle finger. Patient reports that she had a CSI on 07/17/23 and reports that it is still providing relief. Patient is here for her right thumb, stating that it has been bothering her since December 2023. Patient has been taking tylenol with minimal relief. Patient would like CSI for her right thumb. Denies numbness/tingling.  05/29/24: f/u right middle finger and right thumb. Patient reports that she is doing well overall, stating that she has some mild discomfort. Patient had a CSI on 01/10/24 in her right thumb and reports it is still providing relief. Patient has been doing HEP with some relief.

## 2024-05-29 NOTE — IMAGING
[de-identified] : Right hand with no swelling or erythema. Able to make fist, oppose thumb to small finger with good thenar bulk. No intrinsic atrophy. Tender along distal palmar crease, most notable at middle finger. No overt locking with catching palpable. Sensation intact throughout with <2sec cap refill. +ttp at thumb CMC, +grind  Right middle finger radiograph with no fracture or dislocation.  Right wrist radiographs with no fracture nor dislocation.. Thumb CMC arthrosis. (3-view)

## 2024-05-29 NOTE — ASSESSMENT
[FreeTextEntry1] : Right middle finger trigger - Discussed with patient the pathoanatomy of trigger and options going forward. Risks/benefits discussed as well as natural progression that injection will provide some relief but symptoms may recur. Discussed that pain may worsen in coming days but will subside. Discussed that we can repeat an injection or that operative intervention may be indicated down the line. After discussion of risks/benefits, including glucose intolerance in the diabetic population, patient elected to proceed with CSI to the A1 pulley.  Procedure 1 - 0.5cc 1% lidocaine + 0.5cc 40mg/cc Kenalog administered to the right middle finger A1 pulley following sterilization with Betadine. Patient tolerated this well.   Right thumb CMC arthritis - reviewed radiographs and discussed pathoanatomy with patient. Discussed treatment ladder including NSAIDs, bracing (shown MetaGrip), hand therapy, CSI and basal joint arthroplasty.   F/u 6 months (obtain right wrist radiographs)

## 2024-06-03 ENCOUNTER — APPOINTMENT (OUTPATIENT)
Dept: ORTHOPEDIC SURGERY | Facility: CLINIC | Age: 69
End: 2024-06-03
Payer: MEDICARE

## 2024-06-03 DIAGNOSIS — M75.42 IMPINGEMENT SYNDROME OF LEFT SHOULDER: ICD-10-CM

## 2024-06-03 DIAGNOSIS — M75.41 IMPINGEMENT SYNDROME OF RIGHT SHOULDER: ICD-10-CM

## 2024-06-03 PROCEDURE — 20610 DRAIN/INJ JOINT/BURSA W/O US: CPT | Mod: 50

## 2024-06-03 PROCEDURE — 99214 OFFICE O/P EST MOD 30 MIN: CPT | Mod: 25

## 2024-06-03 RX ORDER — FLUTICASONE PROPIONATE 50 UG/1
50 SPRAY, METERED NASAL
Qty: 16 | Refills: 0 | Status: ACTIVE | COMMUNITY
Start: 2024-03-20

## 2024-06-03 RX ORDER — SERTRALINE 25 MG/1
25 TABLET, FILM COATED ORAL
Qty: 30 | Refills: 0 | Status: ACTIVE | COMMUNITY
Start: 2024-05-15

## 2024-06-03 RX ORDER — AMOXICILLIN AND CLAVULANATE POTASSIUM 875; 125 MG/1; MG/1
875-125 TABLET, COATED ORAL
Qty: 14 | Refills: 0 | Status: ACTIVE | COMMUNITY
Start: 2024-03-19

## 2024-06-03 NOTE — HISTORY OF PRESENT ILLNESS
[de-identified] : 06/03/24: Follow up for bilateral shoulders. CSI was given on 01/12/24 with relief given for about 6 months. Patient has transitioned from PT to home exercises. she reports pain has been gradually worsening.   Body part causing symptoms is the Bilateral shoulders  Pain score at rest is  8/10 Pain score during activity is  7/10 Treatments since last visit include meloxicam and PT Compared to last visit, symptoms are improved but she still has pain at night She is interested in injection today

## 2024-06-03 NOTE — IMAGING
[de-identified] : (Physical Exam: Shoulder)\par  \par  Laterality is bilateral\par  \par  Patient is in no acute distress, alert and oriented\par  Sensation is grossly intact to light touch in the hand\par  Motor function is 5/5 in the hand\par  Capillary refill is less than 2 seconds in the fingers\par  Lymphadenopathy is not present\par  Peripheral edema is not present\par  \par  Skin is intact \par  Swelling is not present \par  Atrophy is not present\par  Scapular winging is not present\par  Deformity of the AC joint is not present\par  Deformity of the biceps is not present \par  \par  Bicipital groove tenderness is not present \par  AC joint tenderness is not present \par  Scapulothoracic tenderness is not present \par  Cervical paraspinal tenderness is not present \par  \par  Active forward elevation is 175\par  Passive forward elevation is 175\par  External rotation at the side is 80\par  Internal rotation behind the back is to the level of T7 \par  \par  Forward elevation strength is 5/5\par  External rotation strength at the side is 5/5 \par  Internal rotation strength at the side is 5/5 \par  Deltoid strength of anterior, posterior and lateral heads is 5/5\par  \par  Ahn test is abnormal \par  [place]'s test is normal\par  Speed's test is normal\par  Cross body adduction test is normal\par  Apprehension and relocation is normal\par  Sulcus sign is normal\par  Belly press test is normal\par  Spurling's test is normal\par

## 2024-06-03 NOTE — DISCUSSION/SUMMARY
[de-identified] : (Assessment and Plan)  History, clinical examination and imaging were most consistent with: -bilateral shoulder subacromial impingement and partial rotator cuff tear -prior cervical fusion  The diagnosis was explained in detail. The potential non-surgical and surgical treatments were reviewed. The relative risks and benefits of each option were considered relative to the patient's age, activity level, medical history, symptom severity and previously attempted treatments.   The patient was advised to consult with their primary medical provider prior to initiation of any new medications to reduce the risk of adverse effects specific to their long-term home medications and medical history. The risk of gastrointestinal irritation and kidney injury specific to long-term NSAID use was discussed.     -Patient wishes to continue with non-surgical treatment at this time. -Repeat cortisone injection performed today for symptom relief. -OTC NSAID as needed for pain.  -MRI is discussed and deferred at this time. -Follow up as needed, consider CSI vs MRI.   (MDM)   Problem Complexity -Moderate: chronic illness with exacerbation  Risk -Moderate: injection  (Procedure Note)  Injection location was the: -left subacromial bursa -right subacromial bursa  Injection contents were:  40 mg of kenalog and 5 mL of 1% lidocaine  Procedure Details:  -Informed consent was obtained. Discussed possible risks of corticosteroid injection including hyperglycemia, infection and skin discoloration.  -Discussed that due to infection risk, an interval between injection and any future surgery is 6 weeks for arthroscopy and 3 months for arthroplasty. -Injection performed using aseptic technique. Hemostasis was confirmed. -Procedure was tolerated well with no complications.

## 2024-09-06 ENCOUNTER — APPOINTMENT (OUTPATIENT)
Dept: ORTHOPEDIC SURGERY | Facility: CLINIC | Age: 69
End: 2024-09-06
Payer: MEDICARE

## 2024-09-06 DIAGNOSIS — M75.41 IMPINGEMENT SYNDROME OF RIGHT SHOULDER: ICD-10-CM

## 2024-09-06 DIAGNOSIS — M75.42 IMPINGEMENT SYNDROME OF LEFT SHOULDER: ICD-10-CM

## 2024-09-06 PROCEDURE — 99214 OFFICE O/P EST MOD 30 MIN: CPT | Mod: 25

## 2024-09-06 PROCEDURE — 20610 DRAIN/INJ JOINT/BURSA W/O US: CPT | Mod: 50

## 2024-09-06 NOTE — HISTORY OF PRESENT ILLNESS
[de-identified] : 09/06/2024: f/u for b/l shoulders.  CSI performed on 6/3/24 in both shoulders with relief. pain slowly returning. doing HEP almost every day.   06/03/24: Follow up for bilateral shoulders. CSI was given on 01/12/24 with relief given for about 6 months. Patient has transitioned from PT to home exercises. she reports pain has been gradually worsening.   Body part causing symptoms is the Bilateral shoulders  Pain score at rest is  8/10 Pain score during activity is  7/10 Treatments since last visit include meloxicam and PT Compared to last visit, symptoms are improved but she still has pain at night She is interested in injection today

## 2024-09-06 NOTE — IMAGING
[de-identified] : (Physical Exam: Shoulder)\par  \par  Laterality is bilateral\par  \par  Patient is in no acute distress, alert and oriented\par  Sensation is grossly intact to light touch in the hand\par  Motor function is 5/5 in the hand\par  Capillary refill is less than 2 seconds in the fingers\par  Lymphadenopathy is not present\par  Peripheral edema is not present\par  \par  Skin is intact \par  Swelling is not present \par  Atrophy is not present\par  Scapular winging is not present\par  Deformity of the AC joint is not present\par  Deformity of the biceps is not present \par  \par  Bicipital groove tenderness is not present \par  AC joint tenderness is not present \par  Scapulothoracic tenderness is not present \par  Cervical paraspinal tenderness is not present \par  \par  Active forward elevation is 175\par  Passive forward elevation is 175\par  External rotation at the side is 80\par  Internal rotation behind the back is to the level of T7 \par  \par  Forward elevation strength is 5/5\par  External rotation strength at the side is 5/5 \par  Internal rotation strength at the side is 5/5 \par  Deltoid strength of anterior, posterior and lateral heads is 5/5\par  \par  Ahn test is abnormal \par  Gillespie's test is normal\par  Speed's test is normal\par  Cross body adduction test is normal\par  Apprehension and relocation is normal\par  Sulcus sign is normal\par  Belly press test is normal\par  Spurling's test is normal\par

## 2024-09-06 NOTE — DISCUSSION/SUMMARY
[de-identified] : (Assessment and Plan)  History, clinical examination and imaging were most consistent with: -bilateral shoulder subacromial impingement and partial rotator cuff tear -prior cervical fusion  The diagnosis was explained in detail. The potential non-surgical and surgical treatments were reviewed. The relative risks and benefits of each option were considered relative to the patient's age, activity level, medical history, symptom severity and previously attempted treatments.   The patient was advised to consult with their primary medical provider prior to initiation of any new medications to reduce the risk of adverse effects specific to their long-term home medications and medical history. The risk of gastrointestinal irritation and kidney injury specific to long-term NSAID use was discussed.     -Patient wishes to continue with non-surgical treatment at this time. -Repeat cortisone injection performed today for symptom relief. -OTC NSAID as needed for pain.  -MRI is discussed and deferred at this time. -Follow up as needed, consider CSI vs MRI.   (MDM)   Problem Complexity -Moderate: chronic illness with exacerbation  Risk -Moderate: injection  (Procedure Note)  Injection location was the: -left subacromial bursa -right subacromial bursa  Injection contents were:  40 mg of kenalog and 5 mL of 1% lidocaine  Procedure Details:  -Informed consent was obtained. Discussed possible risks of corticosteroid injection including hyperglycemia, infection and skin discoloration.  -Discussed that due to infection risk, an interval between injection and any future surgery is 6 weeks for arthroscopy and 3 months for arthroplasty. -Injection performed using aseptic technique. Hemostasis was confirmed. -Procedure was tolerated well with no complications.

## 2024-10-21 ENCOUNTER — APPOINTMENT (OUTPATIENT)
Dept: ORTHOPEDIC SURGERY | Facility: CLINIC | Age: 69
End: 2024-10-21
Payer: MEDICARE

## 2024-10-21 DIAGNOSIS — M75.42 IMPINGEMENT SYNDROME OF LEFT SHOULDER: ICD-10-CM

## 2024-10-21 DIAGNOSIS — M75.41 IMPINGEMENT SYNDROME OF RIGHT SHOULDER: ICD-10-CM

## 2024-10-21 PROCEDURE — 99213 OFFICE O/P EST LOW 20 MIN: CPT

## 2024-10-21 PROCEDURE — 73030 X-RAY EXAM OF SHOULDER: CPT | Mod: LT

## 2024-10-24 ENCOUNTER — RESULT REVIEW (OUTPATIENT)
Age: 69
End: 2024-10-24

## 2024-11-22 ENCOUNTER — APPOINTMENT (OUTPATIENT)
Dept: ORTHOPEDIC SURGERY | Facility: CLINIC | Age: 69
End: 2024-11-22
Payer: MEDICARE

## 2024-11-22 DIAGNOSIS — M75.122 COMPLETE ROTATOR CUFF TEAR OR RUPTURE OF LEFT SHOULDER, NOT SPECIFIED AS TRAUMATIC: ICD-10-CM

## 2024-11-22 PROCEDURE — 99214 OFFICE O/P EST MOD 30 MIN: CPT

## 2025-01-06 ENCOUNTER — APPOINTMENT (OUTPATIENT)
Dept: OBGYN | Facility: CLINIC | Age: 70
End: 2025-01-06

## 2025-01-06 ENCOUNTER — APPOINTMENT (OUTPATIENT)
Dept: ORTHOPEDIC SURGERY | Facility: CLINIC | Age: 70
End: 2025-01-06
Payer: MEDICARE

## 2025-01-06 VITALS — WEIGHT: 127 LBS | BODY MASS INDEX: 24.94 KG/M2 | HEIGHT: 60 IN

## 2025-01-06 DIAGNOSIS — M79.18 MYALGIA, OTHER SITE: ICD-10-CM

## 2025-01-06 DIAGNOSIS — M25.511 PAIN IN RIGHT SHOULDER: ICD-10-CM

## 2025-01-06 DIAGNOSIS — M75.122 COMPLETE ROTATOR CUFF TEAR OR RUPTURE OF LEFT SHOULDER, NOT SPECIFIED AS TRAUMATIC: ICD-10-CM

## 2025-01-06 DIAGNOSIS — S49.91XA UNSPECIFIED INJURY OF RIGHT SHOULDER AND UPPER ARM, INITIAL ENCOUNTER: ICD-10-CM

## 2025-01-06 DIAGNOSIS — M25.512 PAIN IN LEFT SHOULDER: ICD-10-CM

## 2025-01-06 DIAGNOSIS — M19.011 PRIMARY OSTEOARTHRITIS, RIGHT SHOULDER: ICD-10-CM

## 2025-01-06 DIAGNOSIS — S43.431D SUPERIOR GLENOID LABRUM LESION OF RIGHT SHOULDER, SUBSEQUENT ENCOUNTER: ICD-10-CM

## 2025-01-06 DIAGNOSIS — M75.51 BURSITIS OF RIGHT SHOULDER: ICD-10-CM

## 2025-01-06 DIAGNOSIS — M75.41 IMPINGEMENT SYNDROME OF RIGHT SHOULDER: ICD-10-CM

## 2025-01-06 DIAGNOSIS — M65.911 UNSPECIFIED SYNOVITIS AND TENOSYNOVITIS, RIGHT SHOULDER: ICD-10-CM

## 2025-01-06 PROCEDURE — 73030 X-RAY EXAM OF SHOULDER: CPT | Mod: RT

## 2025-01-06 PROCEDURE — 99214 OFFICE O/P EST MOD 30 MIN: CPT

## 2025-01-29 ENCOUNTER — APPOINTMENT (OUTPATIENT)
Dept: ORTHOPEDIC SURGERY | Facility: HOSPITAL | Age: 70
End: 2025-01-29

## 2025-01-30 ENCOUNTER — APPOINTMENT (OUTPATIENT)
Dept: ORTHOPEDIC SURGERY | Facility: AMBULATORY SURGERY CENTER | Age: 70
End: 2025-01-30
Payer: MEDICARE

## 2025-01-30 PROCEDURE — 29827 SHO ARTHRS SRG RT8TR CUF RPR: CPT | Mod: LT

## 2025-01-30 PROCEDURE — 29828 SHO ARTHRS SRG BICP TENODSIS: CPT | Mod: AS,LT

## 2025-01-30 PROCEDURE — 29827 SHO ARTHRS SRG RT8TR CUF RPR: CPT | Mod: AS,LT

## 2025-01-30 PROCEDURE — 29828 SHO ARTHRS SRG BICP TENODSIS: CPT | Mod: LT

## 2025-01-30 PROCEDURE — 29826 SHO ARTHRS SRG DECOMPRESSION: CPT | Mod: LT

## 2025-01-30 PROCEDURE — 29826 SHO ARTHRS SRG DECOMPRESSION: CPT | Mod: AS,LT

## 2025-01-30 PROCEDURE — 29823 SHO ARTHRS SRG XTNSV DBRDMT: CPT | Mod: AS,59,LT

## 2025-01-30 PROCEDURE — 29823 SHO ARTHRS SRG XTNSV DBRDMT: CPT | Mod: 59,LT

## 2025-01-30 RX ORDER — ONDANSETRON 4 MG/1
4 TABLET, ORALLY DISINTEGRATING ORAL EVERY 8 HOURS
Qty: 12 | Refills: 0 | Status: ACTIVE | COMMUNITY
Start: 2025-01-30 | End: 1900-01-01

## 2025-01-30 RX ORDER — DOCUSATE SODIUM 50 MG/1
50 CAPSULE, LIQUID FILLED ORAL
Qty: 21 | Refills: 0 | Status: ACTIVE | COMMUNITY
Start: 2025-01-30 | End: 1900-01-01

## 2025-01-30 RX ORDER — OXYCODONE AND ACETAMINOPHEN 5; 325 MG/1; MG/1
5-325 TABLET ORAL
Qty: 30 | Refills: 0 | Status: ACTIVE | COMMUNITY
Start: 2025-01-30 | End: 1900-01-01

## 2025-02-10 ENCOUNTER — APPOINTMENT (OUTPATIENT)
Dept: ORTHOPEDIC SURGERY | Facility: CLINIC | Age: 70
End: 2025-02-10
Payer: MEDICARE

## 2025-02-10 DIAGNOSIS — M75.122 COMPLETE ROTATOR CUFF TEAR OR RUPTURE OF LEFT SHOULDER, NOT SPECIFIED AS TRAUMATIC: ICD-10-CM

## 2025-02-10 PROCEDURE — 99024 POSTOP FOLLOW-UP VISIT: CPT

## 2025-02-14 ENCOUNTER — APPOINTMENT (OUTPATIENT)
Dept: ORTHOPEDIC SURGERY | Facility: CLINIC | Age: 70
End: 2025-02-14
Payer: MEDICARE

## 2025-02-14 VITALS — HEIGHT: 60 IN | BODY MASS INDEX: 24.94 KG/M2 | WEIGHT: 127 LBS

## 2025-02-14 DIAGNOSIS — M47.816 SPONDYLOSIS W/OUT MYELOPATHY OR RADICULOPATHY, LUMBAR REGION: ICD-10-CM

## 2025-02-14 PROCEDURE — 72100 X-RAY EXAM L-S SPINE 2/3 VWS: CPT

## 2025-02-14 PROCEDURE — 99213 OFFICE O/P EST LOW 20 MIN: CPT | Mod: 24

## 2025-02-17 ENCOUNTER — APPOINTMENT (OUTPATIENT)
Dept: ORTHOPEDIC SURGERY | Facility: CLINIC | Age: 70
End: 2025-02-17

## 2025-03-17 ENCOUNTER — APPOINTMENT (OUTPATIENT)
Dept: ORTHOPEDIC SURGERY | Facility: CLINIC | Age: 70
End: 2025-03-17
Payer: MEDICARE

## 2025-03-17 DIAGNOSIS — M75.122 COMPLETE ROTATOR CUFF TEAR OR RUPTURE OF LEFT SHOULDER, NOT SPECIFIED AS TRAUMATIC: ICD-10-CM

## 2025-03-17 PROCEDURE — 99024 POSTOP FOLLOW-UP VISIT: CPT

## 2025-03-24 ENCOUNTER — APPOINTMENT (OUTPATIENT)
Dept: ORTHOPEDIC SURGERY | Facility: CLINIC | Age: 70
End: 2025-03-24

## 2025-03-24 VITALS — BODY MASS INDEX: 24.94 KG/M2 | WEIGHT: 127 LBS | HEIGHT: 60 IN

## 2025-03-24 DIAGNOSIS — M65.30 TRIGGER FINGER, UNSPECIFIED FINGER: ICD-10-CM

## 2025-03-24 PROCEDURE — 73110 X-RAY EXAM OF WRIST: CPT | Mod: RT

## 2025-03-24 PROCEDURE — 99214 OFFICE O/P EST MOD 30 MIN: CPT | Mod: 25

## 2025-03-24 PROCEDURE — 20550 NJX 1 TENDON SHEATH/LIGAMENT: CPT | Mod: RT

## 2025-03-24 PROCEDURE — 20550 NJX 1 TENDON SHEATH/LIGAMENT: CPT | Mod: 59,RT

## 2025-05-05 ENCOUNTER — APPOINTMENT (OUTPATIENT)
Dept: ORTHOPEDIC SURGERY | Facility: CLINIC | Age: 70
End: 2025-05-05

## 2025-05-05 VITALS — HEIGHT: 60 IN | BODY MASS INDEX: 24.94 KG/M2 | WEIGHT: 127 LBS

## 2025-05-05 DIAGNOSIS — M75.41 IMPINGEMENT SYNDROME OF RIGHT SHOULDER: ICD-10-CM

## 2025-05-05 DIAGNOSIS — M65.911 UNSPECIFIED SYNOVITIS AND TENOSYNOVITIS, RIGHT SHOULDER: ICD-10-CM

## 2025-05-05 DIAGNOSIS — M75.122 COMPLETE ROTATOR CUFF TEAR OR RUPTURE OF LEFT SHOULDER, NOT SPECIFIED AS TRAUMATIC: ICD-10-CM

## 2025-05-05 DIAGNOSIS — S43.431D SUPERIOR GLENOID LABRUM LESION OF RIGHT SHOULDER, SUBSEQUENT ENCOUNTER: ICD-10-CM

## 2025-05-05 PROCEDURE — 99213 OFFICE O/P EST LOW 20 MIN: CPT

## 2025-07-14 ENCOUNTER — APPOINTMENT (OUTPATIENT)
Dept: ORTHOPEDIC SURGERY | Facility: CLINIC | Age: 70
End: 2025-07-14

## 2025-09-12 ENCOUNTER — APPOINTMENT (OUTPATIENT)
Dept: ORTHOPEDIC SURGERY | Facility: CLINIC | Age: 70
End: 2025-09-12